# Patient Record
Sex: FEMALE | Race: WHITE | URBAN - METROPOLITAN AREA
[De-identification: names, ages, dates, MRNs, and addresses within clinical notes are randomized per-mention and may not be internally consistent; named-entity substitution may affect disease eponyms.]

---
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
## 2021-12-13 ENCOUNTER — APPOINTMENT (RX ONLY)
Dept: URBAN - METROPOLITAN AREA CLINIC 111 | Facility: CLINIC | Age: 21
Setting detail: DERMATOLOGY
End: 2021-12-13

## 2021-12-13 DIAGNOSIS — Z41.9 ENCOUNTER FOR PROCEDURE FOR PURPOSES OTHER THAN REMEDYING HEALTH STATE, UNSPECIFIED: ICD-10-CM

## 2021-12-13 PROCEDURE — ? HYDRAFACIAL

## 2021-12-13 ASSESSMENT — LOCATION SIMPLE DESCRIPTION DERM: LOCATION SIMPLE: LEFT CHEEK

## 2021-12-13 ASSESSMENT — LOCATION DETAILED DESCRIPTION DERM: LOCATION DETAILED: LEFT INFERIOR CENTRAL MALAR CHEEK

## 2021-12-13 ASSESSMENT — LOCATION ZONE DERM: LOCATION ZONE: FACE

## 2021-12-13 NOTE — PROCEDURE: HYDRAFACIAL
Tip Override
Tip: Hydropeel Tip, Clear
Vacuum Pressure Low Setting (Will Not Render If Set To 0): 14
Indication: acne
Tip: Hydropeel Tip, Blue
Procedure: Boost
Number Of Passes: 2
Solution: Beta-HD
Vacuum Pressure High Setting (Will Not Render If Set To 0): 0
Procedure: Exfoliation
Location: face
Solution Override
Tip: Hydropeel Tip, Teal
Vacuum Pressure Low Setting (Will Not Render If Set To 0): 19
Consent: Verbal consent obtained, risks reviewed including but not limited to crusting, scabbing, blistering, scarring, darker or lighter pigmentary change, bruising, and/or incomplete response.
Solution: Activ-4
Procedure: Fusion
Post-Care Instructions: I reviewed with the patient in detail post-care instructions. Patient should stay away from the sun and wear sun protection until treated areas are fully healed.
Procedure: Peel
Number Of Passes: 4
Vacuum Pressure Low Setting (Will Not Render If Set To 0): 14
Vacuum Pressure Low Setting (Will Not Render If Set To 0): 16
Procedure: Extraction
Solution Override: see boost
Solution: GlySal 15%
Treatment Number: 1
Price (Use Numbers Only, No Special Characters Or $): 048
Comments: Minor extractions performed
Solution: Antiox-6

## 2022-11-22 ENCOUNTER — APPOINTMENT (RX ONLY)
Dept: URBAN - METROPOLITAN AREA CLINIC 111 | Facility: CLINIC | Age: 22
Setting detail: DERMATOLOGY
End: 2022-11-22

## 2022-11-22 DIAGNOSIS — Z41.9 ENCOUNTER FOR PROCEDURE FOR PURPOSES OTHER THAN REMEDYING HEALTH STATE, UNSPECIFIED: ICD-10-CM

## 2022-11-22 PROCEDURE — ? HYDRAFACIAL

## 2022-11-22 PROCEDURE — ? DIAGNOSIS COMMENT

## 2022-11-22 NOTE — PROCEDURE: DIAGNOSIS COMMENT
Detail Level: Zone
Comment: Gave samples of Revision vitamin c 30%+
Render Risk Assessment In Note?: no

## 2022-11-22 NOTE — PROCEDURE: HYDRAFACIAL
Number Of Passes: 2
Vacuum Pressure Low Setting (Will Not Render If Set To 0): 16
Vacuum Pressure Low Setting (Will Not Render If Set To 0): 16
Procedure: Fusion
Vacuum Pressure High Setting (Will Not Render If Set To 0): 0
Number Of Passes: 4
Solution: GlySal 15%
Treatment Number: 1
Price (Use Numbers Only, No Special Characters Or $): 944
Tip: Hydropeel Tip, Clear
Tip: Hydropeel Tip, Teal
Tip Override
Solution: Antiox-6
Tip: Hydropeel Tip, Blue
Procedure: Boost
Indication: acne
Procedure: Extraction
Procedure: Exfoliation
Solution Override
Location: face
Solution: Beta-HD
Consent: Verbal consent obtained, risks reviewed including but not limited to crusting, scabbing, blistering, scarring, darker or lighter pigmentary change, bruising, and/or incomplete response.
Vacuum Pressure Low Setting (Will Not Render If Set To 0): 22
Post-Care Instructions: I reviewed with the patient in detail post-care instructions. Patient should stay away from the sun and wear sun protection until treated areas are fully healed.
Procedure: Peel
Solution: Activ-4
Solution Override: see boost

## 2023-12-21 ENCOUNTER — RX ONLY (OUTPATIENT)
Age: 23
Setting detail: RX ONLY
End: 2023-12-21

## 2023-12-21 ENCOUNTER — APPOINTMENT (RX ONLY)
Dept: URBAN - METROPOLITAN AREA CLINIC 111 | Facility: CLINIC | Age: 23
Setting detail: DERMATOLOGY
End: 2023-12-21

## 2023-12-21 DIAGNOSIS — Z41.9 ENCOUNTER FOR PROCEDURE FOR PURPOSES OTHER THAN REMEDYING HEALTH STATE, UNSPECIFIED: ICD-10-CM

## 2023-12-21 PROCEDURE — ? HYDRAFACIAL

## 2023-12-21 PROCEDURE — ? DIAGNOSIS COMMENT

## 2023-12-21 RX ORDER — TAZAROTENE 0.45 MG/G
LOTION TOPICAL
Qty: 45 | Refills: 1 | Status: ERX | COMMUNITY
Start: 2023-12-21

## 2023-12-21 NOTE — PROCEDURE: HYDRAFACIAL
Vacuum Pressure Low Setting (Will Not Render If Set To 0): 25
Number Of Passes: 2
Vacuum Pressure Low Setting (Will Not Render If Set To 0): 16
Solution Override
Solution: Activ-4
Consent: Verbal consent obtained, risks reviewed including but not limited to crusting, scabbing, blistering, scarring, darker or lighter pigmentary change, bruising, and/or incomplete response.
Solution: Antiox-6
Tip: Hydropeel Tip, Teal
Post-Care Instructions: I reviewed with the patient in detail post-care instructions. Patient should stay away from the sun and wear sun protection until treated areas are fully healed.
Procedure: Extraction
Price (Use Numbers Only, No Special Characters Or $): 037
Vacuum Pressure High Setting (Will Not Render If Set To 0): 0
Tip: Hydropeel Tip, Blue
Vacuum Pressure Low Setting (Will Not Render If Set To 0): 16
Treatment Number: 1
Procedure: Fusion
Solution Override: see boost
Procedure: Peel
Tip: Hydropeel Tip, Clear
Indication: dehydrated skin
Solution: Beta-HD
Stroke (Optional): SCALP EXFOLIATION
Tip: Hydropeel Tip, Blue Body
Vacuum Pressure Low Setting (Will Not Render If Set To 0): 22
Procedure: Boost
Solution: GlySal 15%
Procedure: Exfoliation
Number Of Passes: 4
Location: face

## 2023-12-21 NOTE — PROCEDURE: DIAGNOSIS COMMENT
Comment: Patient to restart retinoid - Arazlo ok by Dr. Torres.
Detail Level: Simple
Render Risk Assessment In Note?: no

## 2025-07-29 ENCOUNTER — APPOINTMENT (OUTPATIENT)
Dept: URBAN - METROPOLITAN AREA CLINIC 111 | Facility: CLINIC | Age: 25
Setting detail: DERMATOLOGY
End: 2025-07-29

## 2025-07-29 DIAGNOSIS — Z41.9 ENCOUNTER FOR PROCEDURE FOR PURPOSES OTHER THAN REMEDYING HEALTH STATE, UNSPECIFIED: ICD-10-CM

## 2025-07-29 PROCEDURE — ? HYDRAFACIAL

## 2025-07-29 PROCEDURE — ? DIAGNOSIS COMMENT
